# Patient Record
Sex: MALE | Race: WHITE
[De-identification: names, ages, dates, MRNs, and addresses within clinical notes are randomized per-mention and may not be internally consistent; named-entity substitution may affect disease eponyms.]

---

## 2019-11-21 ENCOUNTER — HOSPITAL ENCOUNTER (EMERGENCY)
Dept: HOSPITAL 97 - ER | Age: 54
Discharge: HOME | End: 2019-11-21
Payer: OTHER GOVERNMENT

## 2019-11-21 VITALS — TEMPERATURE: 98.4 F | OXYGEN SATURATION: 98 %

## 2019-11-21 VITALS — SYSTOLIC BLOOD PRESSURE: 121 MMHG | DIASTOLIC BLOOD PRESSURE: 80 MMHG
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
## 2019-11-21 DIAGNOSIS — Z72.0: ICD-10-CM

## 2019-11-21 DIAGNOSIS — M25.551: ICD-10-CM
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
## 2019-11-21 DIAGNOSIS — G89.29: Primary | ICD-10-CM

## 2019-11-21 PROCEDURE — 99284 EMERGENCY DEPT VISIT MOD MDM: CPT

## 2019-11-21 NOTE — RAD REPORT
EXAM DESCRIPTION:  RAD - Femur Right - 11/21/2019 8:32 pm

 

CLINICAL HISTORY:  Leg pain

 

FINDINGS:  No fracture is seen.

## 2019-11-21 NOTE — RAD REPORT
EXAM DESCRIPTION:  RAD - Hip Right 2 View - 11/21/2019 8:32 pm

 

CLINICAL HISTORY:  Right hip pain

 

FINDINGS:  No fracture or dislocation is seen.

## 2019-11-21 NOTE — EDPHYS
Physician Documentation                                                                           

 [facility]                                                                 

Name: Aimsh Espinosa                                                                                 

Age: 54 yrs                                                                                       

Sex: Male                                                                                         

: 1965                                                                                   

MRN: [id number]                                                                                   

Arrival Date: 2019                                                                          

Time: 19:51                                                                                       

Account#: [id number]                                                                            

Bed 15                                                                                            

Private MD:                                                                                       

ED Physician Sidney Bennett                                                                      

HPI:                                                                                              

                                                                                             

21:11 This 54 yrs old  Male presents to ER via EMS with complaints of Back Pain.     kb  

21:12 Details of fall: The patient fell from an upright position, while standing. Onset: The  kb  

      symptoms/episode began/occurred just prior to arrival. Associated injuries: The patient     

      sustained right hip and right knee and right forearm, painful injury. Severity of           

      symptoms: At their worst the symptoms were moderate, in the emergency department the        

      symptoms are unchanged. The patient has not experienced similar symptoms in the past.       

      The patient has not recently seen a physician. Pt reports chronic pain to right knee,       

      right hip, neck and back. Reports he fell in the shower just pta and now the chronic        

      pain is exacerbated. Denies any new pain.                                                   

                                                                                                  

Historical:                                                                                       

- Allergies:                                                                                      

19:55 No Known Allergies;                                                                     wh  

- Home Meds:                                                                                      

19:55 Morphine Oral [Active]; Flexeril Oral [Active];                                         wh  

- PMHx:                                                                                           

19:55 Compression Fractures;                                                                    

- PSHx:                                                                                           

19:55 Spinal Fusions;                                                                         wh  

                                                                                                  

- Immunization history:: Adult Immunizations not up to date.                                      

- Social history:: Smoking status: Patient uses tobacco products, denies chronic                  

  smoking, but will smoke occasionally.                                                           

- Ebola Screening: : Patient negative for fever greater than or equal to 101.5 degrees            

  Fahrenheit, and additional compatible Ebola Virus Disease symptoms Patient denies               

  exposure to infectious person.                                                                  

                                                                                                  

                                                                                                  

ROS:                                                                                              

21:10 Constitutional: Negative for fever, chills, and weight loss, ENT: Negative for injury,  kb  

      pain, and discharge, Neck: Negative for injury, pain, and swelling, Cardiovascular:         

      Negative for chest pain, palpitations, and edema, Respiratory: Negative for shortness       

      of breath, cough, wheezing, and pleuritic chest pain, Abdomen/GI: Negative for              

      abdominal pain, nausea, vomiting, diarrhea, and constipation, Back: Negative for injury     

      and pain, Skin: Negative for injury, rash, and discoloration, Neuro: Negative for           

      headache, weakness, numbness, tingling, and seizure.                                        

21:10 MS/extremity: Positive for pain, of the right hip, right forearm and right knee.            

                                                                                                  

Exam:                                                                                             

21:10 Constitutional:  This is a well developed, well nourished patient who is awake, alert,  kb  

      and in no acute distress. Head/Face:  Normocephalic, atraumatic. ENT:  Nares patent. No     

      nasal discharge, no septal abnormalities noted.  Tympanic membranes are normal and          

      external auditory canals are clear.  Oropharynx with no redness, swelling, or masses,       

      exudates, or evidence of obstruction, uvula midline.  Mucous membranes moist. Neck:         

      Trachea midline, no thyromegaly or masses palpated, and no cervical lymphadenopathy.        

      Supple, full range of motion without nuchal rigidity, or vertebral point tenderness.        

      No Meningismus. Chest/axilla:  Normal chest wall appearance and motion.  Nontender with     

      no deformity.  No lesions are appreciated. Cardiovascular:  Regular rate and rhythm         

      with a normal S1 and S2.  No gallops, murmurs, or rubs.  Normal PMI, no JVD.  No pulse      

      deficits. Respiratory:  Lungs have equal breath sounds bilaterally, clear to                

      auscultation and percussion.  No rales, rhonchi or wheezes noted.  No increased work of     

      breathing, no retractions or nasal flaring. Abdomen/GI:  Soft, non-tender, with normal      

      bowel sounds.  No distension or tympany.  No guarding or rebound.  No evidence of           

      tenderness throughout. Back:  No spinal tenderness.  No costovertebral tenderness.          

      Full range of motion. Skin:  Warm, dry with normal turgor.  Normal color with no            

      rashes, no lesions, and no evidence of cellulitis. MS/ Extremity:  Pulses equal, no         

      cyanosis.  Neurovascular intact.  Full, normal range of motion. Neuro:  Awake and           

      alert, GCS 15, oriented to person, place, time, and situation.  Cranial nerves II-XII       

      grossly intact.  Motor strength 5/5 in all extremities.  Sensory grossly intact.            

      Cerebellar exam normal.  Normal gait.                                                       

                                                                                                  

Vital Signs:                                                                                      

19:55  / 82; Pulse 69; Resp 18; Temp 98.4; Pulse Ox 98% ; Weight 104.33 kg; Height 6    wh  

      ft. 0 in. (182.88 cm);                                                                      

20:00  / 80; Pulse 68; Resp 18; Pulse Ox 98% on R/A;                                    ea  

19:55 Body Mass Index 31.19 (104.33 kg, 182.88 cm)                                              

                                                                                                  

MDM:                                                                                              

19:57 Patient medically screened.                                                             kb  

21:09 Data reviewed: vital signs, nurses notes. Data interpreted: Pulse oximetry: on room air kb  

      is 98 %. Interpretation: normal. Counseling: I had a detailed discussion with the           

      patient and/or guardian regarding: the historical points, exam findings, and any            

      diagnostic results supporting the discharge/admit diagnosis, radiology results, the         

      need for outpatient follow up, a family practitioner, to return to the emergency            

      department if symptoms worsen or persist or if there are any questions or concerns that     

      arise at home.                                                                              

                                                                                                  

                                                                                             

20:02 Order name: Hip Right 2 View XRAY; Complete Time: 21:14                                 kb  

                                                                                             

20:02 Order name: Femur Right XRAY; Complete Time: 21:                                      kb  

                                                                                             

20:02 Order name: Forearm Right XRAY; Complete Time: 21:14                                    kb  

                                                                                             

22:13 Order name: Crutches; Complete Time: 22:13                                              ea  

                                                                                                  

Administered Medications:                                                                         

20:09 Drug: Norco 10 mg-325 mg 1 tabs {Note: RASS 0.} Route: PO;                              ea  

21:14 Follow up: Response: No adverse reaction; Pain is decreased; RASS: Alert and Calm (0)   ea  

                                                                                                  

                                                                                                  

Disposition:                                                                                      

                                                                                             

12:09 Co-signature as Attending Physician, Sidney Bennett MD I agree with the assessment and  marta 

      plan of care.                                                                               

                                                                                                  

Disposition:                                                                                      

19 21:15 Discharged to Home. Impression: Fall on same level from slipping, tripping and     

  stumbling, Pain in right hip, Pain in right knee, Chronic pain, not elsewhere                   

  classified.                                                                                     

- Condition is Stable.                                                                            

- Discharge Instructions: Chronic Pain, Musculoskeletal Pain, Fall Prevention in the              

  Home, Easy-to-Read.                                                                             

- Prescriptions for Cyclobenzaprine 10 mg Oral Tablet - take 1 tablet by ORAL route               

  every 8 hours As needed; 21 tablet. Diclofenac Sodium 75 mg Oral Tablet, Delayed                

  Release (E.C.) - take 1 tablet by ORAL route 2 times per day As needed; 30 tablet.              

- Medication Reconciliation Form, Thank You Letter, Antibiotic Education, Prescription            

  Opioid Use form.                                                                                

- Follow up: Emergency Department; When: As needed; Reason: Worsening of condition.               

  Follow up: Private Physician; When: 2 - 3 days; Reason: Recheck today's complaints,             

  Continuance of care, Re-evaluation by your physician.                                           

                                                                                                  

                                                                                                  

                                                                                                  

Signatures:                                                                                       

Dispatcher [employer]                           EDMS                                                 

Jamison Husseinistin, FNP-C                 FNP-Ckb                                                   

Sidney Bennett MD MD cha Antunez, Elena, RN                      RN   Jelani Velazquez                                                   

                                                                                                  

Corrections: (The following items were deleted from the chart)                                    

                                                                                             

22:15 21:15 2019 21:15 Discharged to Home. Impression: Fall on same level from          ea  

      slipping, tripping and stumbling; Pain in right hip; Pain in right knee; Chronic pain,      

      not elsewhere classified. Condition is Stable. Forms are Medication Reconciliation          

      Form, Thank You Letter, Antibiotic Education, Prescription Opioid Use. Follow up:           

      Emergency Department; When: As needed; Reason: Worsening of condition. Follow up:           

      Private Physician; When: 2 - 3 days; Reason: Recheck today's complaints, Continuance of     

      care, Re-evaluation by your physician. kb                                                   

                                                                                                  

**************************************************************************************************

## 2019-11-21 NOTE — RAD REPORT
EXAM DESCRIPTION:  RAD - Forearm Right - 11/21/2019 8:32 pm

 

CLINICAL HISTORY:  Right arm pain

 

FINDINGS:  No fracture is seen.

## 2019-11-21 NOTE — ER
Nurse's Notes                                                                                     

 [facility]                                                                 

Name: Amish Espinosa                                                                                 

Age: 54 yrs                                                                                       

Sex: Male                                                                                         

: 1965                                                                                   

MRN: [id number]                                                                                   

Arrival Date: 2019                                                                          

Time: 19:51                                                                                       

Account#: [id number]                                                                            

Bed 15                                                                                            

Private MD:                                                                                       

Diagnosis: Fall on same level from slipping, tripping and stumbling;Pain in right hip;Pain in     

  right knee;Chronic pain, not elsewhere classified                                               

                                                                                                  

Presentation:                                                                                     

                                                                                             

19:52 Presenting complaint: EMS states: Pt fell from bath tub hitting his right arm. Pt       wh  

      states he has Hx of chronic back pain and knee pain and was exacerbated because of the      

      fall. Pt denies LOC. Transition of care: patient was not received from another setting      

      of care. Onset of symptoms was 2019. Risk Assessment: Do you want to hurt      

      yourself or someone else? Patient reports no desire to harm self or others. Initial         

      Sepsis Screen: Does the patient meet any 2 criteria? No. Patient's initial sepsis           

      screen is negative. Does the patient have a suspected source of infection? No.              

      Patient's initial sepsis screen is negative. Care prior to arrival: None.                   

19:52 Method Of Arrival: EMS: [facility]  

19:52 Acuity: ANTONINO 4                                                                             

                                                                                                  

Historical:                                                                                       

- Allergies:                                                                                      

19:55 No Known Allergies;                                                                       

- Home Meds:                                                                                      

19:55 Morphine Oral [Active]; Flexeril Oral [Active];                                           

- PMHx:                                                                                           

19:55 Compression Fractures;                                                                    

- PSHx:                                                                                           

19:55 Spinal Fusions;                                                                           

                                                                                                  

- Immunization history:: Adult Immunizations not up to date.                                      

- Social history:: Smoking status: Patient uses tobacco products, denies chronic                  

  smoking, but will smoke occasionally.                                                           

- Ebola Screening: : Patient negative for fever greater than or equal to 101.5 degrees            

  Fahrenheit, and additional compatible Ebola Virus Disease symptoms Patient denies               

  exposure to infectious person.                                                                  

                                                                                                  

                                                                                                  

Screenin:55 Abuse screen: Denies threats or abuse. Denies injuries from another. Nutritional          

      screening: No deficits noted. Tuberculosis screening: No symptoms or risk factors           

      identified. Fall Risk Fall in past 12 months (25 points).                                   

                                                                                                  

Assessment:                                                                                       

21:03 Reassessment: Patient and/or family updated on plan of care and expected duration. Pain ea  

      level reassessed. Pt resting with eyes closed, respirations even and unlabored, chest       

      expansions even and symmetrical. No s/s of pain or discomfort noted at this time.           

22:07 Reassessment: Patient and/or family updated on plan of care and expected duration. Pain ea  

      level reassessed. Pt offered walker as ambulatory aid, pt refused walker stated " I         

      would do better with crutches, I can't lift the walker even a little".                      

22:13 Reassessment: Patient and/or family updated on plan of care and expected duration. Pain ea  

      level reassessed. Patient is alert, oriented x 3, equal unlabored respirations, skin        

      warm/dry/pink. Discharge given to patient, verbalized the understanding of instruction.     

      Pt awaiting in the lobby for taxi.                                                          

                                                                                                  

Vital Signs:                                                                                      

19:55  / 82; Pulse 69; Resp 18; Temp 98.4; Pulse Ox 98% ; Weight 104.33 kg; Height 6    wh  

      ft. 0 in. (182.88 cm);                                                                      

20:00  / 80; Pulse 68; Resp 18; Pulse Ox 98% on R/A;                                    ea  

19:55 Body Mass Index 31.19 (104.33 kg, 182.88 cm)                                              

                                                                                                  

ED Course:                                                                                        

19:51 Patient arrived in ED.                                                                  fc  

19:53 Triage completed.                                                                       wh  

19:56 Arm band placed on.                                                                       

19:56 Patient has correct armband on for positive identification. Bed in low position. Call     

      light in reach. Side rails up X 1. Pulse ox on. NIBP on.                                    

19:57 Nancy Hussein FNP-C is PHCP.                                                        kb  

19:57 Sidney Bennett MD is Attending Physician.                                             kb  

19:58 Caryl Tan RN is Primary Nurse.                                                    ea  

20:31 Hip Right 2 View XRAY In Process Unspecified.                                           EDMS

20:31 Femur Right XRAY In Process Unspecified.                                                EDMS

20:31 Forearm Right XRAY In Process Unspecified.                                              EDMS

22:14 No provider procedures requiring assistance completed. Patient did not have IV access   ea  

      during this emergency room visit.                                                           

                                                                                                  

Administered Medications:                                                                         

20:09 Drug: Norco 10 mg-325 mg 1 tabs {Note: RASS 0.} Route: PO;                              ea  

21:14 Follow up: Response: No adverse reaction; Pain is decreased; RASS: Alert and Calm (0)   ea  

                                                                                                  

                                                                                                  

Outcome:                                                                                          

21:15 Discharge ordered by MD.                                                                kb  

22:13 Discharged to home with crutches.                                                       ea  

22:13 Condition: stable                                                                           

22:13 Discharge instructions given to patient, Instructed on discharge instructions, follow       

      up and referral plans. medication usage, Demonstrated understanding of instructions,        

      follow-up care, medications, Prescriptions given X 2.                                       

22:15 Patient left the ED.                                                                    ea  

                                                                                                  

Signatures:                                                                                       

Dispatcher [employer]                           EDMS                                                 

Nancy Hussein, Augusta Waldrop RN RN fc Antunez, Elena, RN RN ea Habalo, Winsy                                                                                   

                                                                                                  

Corrections: (The following items were deleted from the chart)                                    

21:14 20:09 Norco 10 mg-325 mg 1 tabs PO ea                                                   ea  

22:14 22:13 Reassessment: Patient and/or family updated on plan of care and expected          ea  

      duration. Pain level reassessed. Patient is alert, oriented x 3, equal unlabored            

      respirations, skin warm/dry/pink. Discharge given to patient, verbalized the                

      understanding of instruction ea                                                             

                                                                                                  

**************************************************************************************************

## 2020-03-02 ENCOUNTER — HOSPITAL ENCOUNTER (EMERGENCY)
Dept: HOSPITAL 97 - ER | Age: 55
Discharge: LEFT BEFORE DISCHARGE | End: 2020-03-02
Payer: OTHER GOVERNMENT

## 2020-03-02 VITALS — SYSTOLIC BLOOD PRESSURE: 112 MMHG | DIASTOLIC BLOOD PRESSURE: 83 MMHG | OXYGEN SATURATION: 96 % | TEMPERATURE: 98.6 F

## 2020-03-02 DIAGNOSIS — G89.29: Primary | ICD-10-CM

## 2020-03-02 DIAGNOSIS — Z53.21: ICD-10-CM

## 2020-03-02 PROCEDURE — 99283 EMERGENCY DEPT VISIT LOW MDM: CPT

## 2020-03-02 NOTE — ER
Nurse's Notes                                                                                     

 [facility]                                                                 

Name: Amish Espinosa                                                                                 

Age: 54 yrs                                                                                       

Sex: Male                                                                                         

: 1965                                                                                   

MRN: [id number]                                                                                   

Arrival Date: 2020                                                                          

Time: 13:11                                                                                       

Account#: [id number]                                                                            

Bed 20                                                                                            

Private MD:                                                                                       

Diagnosis: Other chronic pain                                                                     

                                                                                                  

Presentation:                                                                                     

                                                                                             

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 

      [facility] when he is went down on his knees because of knee, back and neck         

      pain and shortness of breath. he has chronic pain. he has history of multiple falls in      

      the past. [id number]. Coronavirus screen: The patient has NOT traveled to [place] in the         

      past 14 days. Proceed with normal triage procedures. The patient has NOT had contact        

      with known and/or suspected case of Coronavirus. Proceed with normal triage procedures.     

      Ebola Screen: No symptoms or risks identified at this time. Initial Sepsis Screen: Does     

      the patient meet any 2 criteria? No. Patient's initial sepsis screen is negative. Does      

      the patient have a suspected source of infection? No. Patient's initial sepsis screen       

      is negative. Risk Assessment: Do you want to hurt yourself or someone else? Patient         

      reports no desire to harm self or others.                                                   

13:11 Method Of Arrival: EMS: [facility]                                                mg2 

13:11 Acuity: ANTONINO 4                                                                           mg2 

                                                                                                  

Historical:                                                                                       

- Allergies:                                                                                      

13:18 No Known Allergies;                                                                     mg2 

- Home Meds:                                                                                      

13:18 Flexeril Oral [Active]; Morphine Oral [Active];                                         mg2 

- PMHx:                                                                                           

13:18 Compression Fractures;                                                                  mg2 

- PSHx:                                                                                           

13:18 knee, back sx;                                                                          mg2 

                                                                                                  

- Immunization history:: Flu vaccine is up to date.                                               

- Social history:: Smoking status: Patient denies any tobacco usage or history of.                

  Patient/guardian denies using alcohol, street drugs, IV drugs.                                  

                                                                                                  

                                                                                                  

Screenin:40 Abuse screen: Denies threats or abuse. Denies injuries from another. Nutritional        mg2 

      screening: No deficits noted. Tuberculosis screening: No symptoms or risk factors           

      identified.                                                                                 

13:40 Fall Risk Ambulatory Aid- Crutches/Cane/Walker (15 pts).                                mg2 

                                                                                                  

Assessment:                                                                                       

13:40 Reassessment: patient refused to stay. wants to leave after started talking to the      mg2 

      provider. offered wheelchair but he refused.  andriy was called too.                     

                                                                                                  

Vital Signs:                                                                                      

13:11  / 83; Pulse 90; Resp 18; Temp 98.6; Pulse Ox 96% on R/A; Weight 99.79 kg; Height mg2 

      6 ft. 0 in. (182.88 cm); Pain 8/10;                                                         

13:11 Body Mass Index 29.84 (99.79 kg, 182.88 cm)                                             mg2 

                                                                                                  

ED Course:                                                                                        

13:11 Patient arrived in ED.                                                                  mg2 

13:15 Triage completed.                                                                       mg2 

13:16 Per Romero MD is Attending Physician.                                            tw4 

13:18 Arm band placed on.                                                                     mg2 

13:40 Reinaldo Puente, RN is Primary Nurse.                                                  mg2 

13:40 No provider procedures requiring assistance completed.                                  mg2 

13:40 Patient did not have IV access during this emergency room visit.                        mg2 

                                                                                                  

Administered Medications:                                                                         

No medications were administered                                                                  

                                                                                                  

                                                                                                  

Outcome:                                                                                          

13:43 Patient left the ED.                                                                    mg2 

13:43 Eloped from patient exam room, after seeing physician Time discovered patient gone:     mg2 

      2020 at 13:43                                                                     

13:43 Condition: stable                                                                           

13:43 Instructed on Demonstrated understanding of instructions.                                   

                                                                                                  

Signatures:                                                                                       

Per Romero MD MD   tw4                                                  

Reinaldo Puente, RN                    RN   mg2                                                  

                                                                                                  

Corrections: (The following items were deleted from the chart)                                    

13:16 13:11 Chief complaint: EMS states: he was at the congressman's office today trying to   mg2 

      [facility] when he is went down on his knees because of knee, back and      

      neck pain and shortness of breath. he has chronic pain. he has history of multiple          

      falls in the past. mg2                                                                      

13:16 13:11 Acuity: ANTONINO 3 mg2                                                                 mg2 

                                                                                                  

**************************************************************************************************

## 2020-03-03 NOTE — EDPHYS
Physician Documentation                                                                           

 [facility]                                                                 

Name: Amish Espinosa                                                                                 

Age: 54 yrs                                                                                       

Sex: Male                                                                                         

: 1965                                                                                   

MRN: [id number]                                                                                   

Arrival Date: 2020                                                                          

Time: 13:11                                                                                       

Account#: [id number]                                                                            

Bed 20                                                                                            

Private MD:                                                                                       

ED Physician Per Romero                                                                     

HPI:                                                                                              

                                                                                             

21:06 This 54 yrs old  Male presents to ER via EMS with complaints of back pain.     tw4 

21:06 The patient presents with pain that is chronic, with no known mechanism of injury.      tw4 

      Onset: The symptoms/episode began/occurred today. Unable to obtain HPI due to patient       

      is being uncooperative.                                                                     

21:06 Pt refused to answer questions and stated that " if we weren't going to help him, he    tw4 

      was leaving". Pt refused exam disconnected leads and stated that he would crawl out of      

      the hospital and seek help elsewhere. Pt refused to stand and crawled on the floor to       

      the lobby.                                                                                  

                                                                                                  

Historical:                                                                                       

- Allergies:                                                                                      

13:18 No Known Allergies;                                                                     mg2 

- Home Meds:                                                                                      

13:18 Flexeril Oral [Active]; Morphine Oral [Active];                                         mg2 

- PMHx:                                                                                           

13:18 Compression Fractures;                                                                  mg2 

- PSHx:                                                                                           

13:18 knee, back sx;                                                                          mg2 

                                                                                                  

- Immunization history:: Flu vaccine is up to date.                                               

- Social history:: Smoking status: Patient denies any tobacco usage or history of.                

  Patient/guardian denies using alcohol, street drugs, IV drugs.                                  

                                                                                                  

                                                                                                  

Vital Signs:                                                                                      

13:11  / 83; Pulse 90; Resp 18; Temp 98.6; Pulse Ox 96% on R/A; Weight 99.79 kg; Height mg2 

      6 ft. 0 in. (182.88 cm); Pain 8/10;                                                         

13:11 Body Mass Index 29.84 (99.79 kg, 182.88 cm)                                             mg2 

                                                                                                  

MDM:                                                                                              

13:16 Patient medically screened.                                                             tw4 

                                                                                                  

Administered Medications:                                                                         

No medications were administered                                                                  

                                                                                                  

                                                                                                  

Disposition:                                                                                      

20 13:42 Patient left the facility after being seen by provider. Preliminary diagnosis      

  is Other chronic pain.                                                                          

- Patient left due to (see nurse's notes).                                                        

                                                                                                  

- Problem is an ongoing problem.                                                                  

- Symptoms are unchanged.                                                                         

                                                                                                  

                                                                                                  

                                                                                                  

Signatures:                                                                                       

Per Romero MD MD   tw4                                                  

Reinaldo Puente RN                    RN   mg2                                                  

                                                                                                  

Corrections: (The following items were deleted from the chart)                                    

13:43 13:42 2020 13:42 Patient left the facility after being seen by provider.          mg2 

      Preliminary diagnosis is Other chronic pain. Reason stated they are leaving due to (see     

      nurse's notes). Problem is an ongoing problem. Symptoms are unchanged. tw4                  

                                                                                                  

**************************************************************************************************

## 2021-03-03 ENCOUNTER — HOSPITAL ENCOUNTER (EMERGENCY)
Dept: HOSPITAL 97 - ER | Age: 56
LOS: 1 days | Discharge: HOME | End: 2021-03-04
Payer: COMMERCIAL

## 2021-03-03 DIAGNOSIS — Z20.822: ICD-10-CM

## 2021-03-03 DIAGNOSIS — F33.9: ICD-10-CM

## 2021-03-03 DIAGNOSIS — G89.29: ICD-10-CM

## 2021-03-03 DIAGNOSIS — Z91.5: ICD-10-CM

## 2021-03-03 DIAGNOSIS — F17.210: ICD-10-CM

## 2021-03-03 DIAGNOSIS — M54.5: ICD-10-CM

## 2021-03-03 DIAGNOSIS — R45.851: Primary | ICD-10-CM

## 2021-03-03 LAB
ALBUMIN SERPL BCP-MCNC: 4.4 G/DL (ref 3.4–5)
ALP SERPL-CCNC: 58 U/L (ref 45–117)
ALT SERPL W P-5'-P-CCNC: 61 U/L (ref 12–78)
AST SERPL W P-5'-P-CCNC: 51 U/L (ref 15–37)
BUN BLD-MCNC: 6 MG/DL (ref 7–18)
GLUCOSE SERPLBLD-MCNC: 97 MG/DL (ref 74–106)
HCT VFR BLD CALC: 47.5 % (ref 39.6–49)
INR BLD: 1.01
LYMPHOCYTES # SPEC AUTO: 2.3 K/UL (ref 0.7–4.9)
METHAMPHET UR QL SCN: NEGATIVE
PMV BLD: 8.4 FL (ref 7.6–11.3)
POTASSIUM SERPL-SCNC: 4.2 MMOL/L (ref 3.5–5.1)
RBC # BLD: 5.34 M/UL (ref 4.33–5.43)
THC SERPL-MCNC: POSITIVE NG/ML

## 2021-03-03 PROCEDURE — 81003 URINALYSIS AUTO W/O SCOPE: CPT

## 2021-03-03 PROCEDURE — 80076 HEPATIC FUNCTION PANEL: CPT

## 2021-03-03 PROCEDURE — 80320 DRUG SCREEN QUANTALCOHOLS: CPT

## 2021-03-03 PROCEDURE — 85025 COMPLETE CBC W/AUTO DIFF WBC: CPT

## 2021-03-03 PROCEDURE — 80329 ANALGESICS NON-OPIOID 1 OR 2: CPT

## 2021-03-03 PROCEDURE — 36415 COLL VENOUS BLD VENIPUNCTURE: CPT

## 2021-03-03 PROCEDURE — 85610 PROTHROMBIN TIME: CPT

## 2021-03-03 PROCEDURE — 72131 CT LUMBAR SPINE W/O DYE: CPT

## 2021-03-03 PROCEDURE — 99285 EMERGENCY DEPT VISIT HI MDM: CPT

## 2021-03-03 PROCEDURE — 80048 BASIC METABOLIC PNL TOTAL CA: CPT

## 2021-03-03 PROCEDURE — 85730 THROMBOPLASTIN TIME PARTIAL: CPT

## 2021-03-03 PROCEDURE — 80307 DRUG TEST PRSMV CHEM ANLYZR: CPT

## 2021-03-03 PROCEDURE — 93005 ELECTROCARDIOGRAM TRACING: CPT

## 2021-03-03 NOTE — XMS REPORT
Continuity of Care Document

                            Created on:March 3, 2021



Patient:JOAN GREGORY

Sex:Male

:1965

External Reference #:[id number]





Demographics







                          Address                   [address]



                                                    [place] [address]

 

                          Home Phone                [phone]

 

                          Mobile Phone              [phone]

 

                          Email Address             [email or web address]

 

                          Preferred Language        English

 

                          Marital Status            Unknown

 

                          Confucianism Affiliation     Unknown

 

                          Race                      Unknown

 

                          Additional Race(s)        Unavailable



                                                    White

 

                          Ethnic Group              Not  or 









Author







                          Organization              [facility]

t

 

                          Address                   [address]



                                                    [place] [address]

 

                          Phone                     [phone]









Support







                Name            Relationship    Address         Phone

 

                Fabián           Mother          [address] [phone]



                                                [place] [address] 

 

                No              Other           Unavailable     [phone]









Care Team Providers







                    Name                Role                Phone

 

                    Asked,  Pcp         Primary Care Physician Unavailable

 

                    Demond Jesus MD    Attending Clinician [phone]









Payers







           Payer Name Policy Type Policy     Effective Date Expiration Date Sour

ce



                                 Number                           

 

           DEPT OF             [id number]  2020            [facility]



           AFFAIRSDEPT OF                       00:00:00              [facility]



                                                           



           [id number]                                             



           /2020-PresentMi                                             



           litary                                                 







Problems







       Condition Condition Condition Status Onset  Resolution Last   Treating Co

mments 

Source



       Name   Details Category        Date   Date   Treatment Clinician        



                                                 Date                 

 

       Rib pain Rib pain Disease Active                              [facility]

s



       on right on right               5-13                               Health



       side   side                 00:00:                             



                                   00                                 

 

       Mixed  Mixed  Disease Active                              [facility]



       hyperlipid hyperlipid               5-                               He

alth



       emia   emia                 00:00:                             



                                   00                                 

 

       Elevated Elevated Disease Active                              [facility]

s



       blood  blood                5-13                               Health



       pressure pressure               00:00:                             



       reading reading               00                                 



       without without                                                  



       diagnosis diagnosis                                                  



       of     of                                                      



       hypertensi hypertensi                                                  



       on     on                                                      

 

       Bulging Bulging Disease Active 2015                             [facility]



       discs  discs                0-22                               Health



                                   00:00:                             



                                   00                                 

 

       Back pain Back pain Disease Active 2015                             [facility]

[facility]



                                   0-22                               Health



                                   00:00:                             



                                   00                                 







Allergies, Adverse Reactions, Alerts

This patient has no known allergies or adverse reactions.



Social History







           Social Habit Start Date Stop Date  Quantity   Comments   Source

 

           History of tobacco                       Cigarette Smoker            

[facility]



           use                                                    

 

           Alcohol Comment                       occassionally            [facility]

 

           Sex Assigned At                                             [facility]

alth



           Birth                                                  

 

           Cigarettes smoked 2016-09-15 2016-09-15                       [facility]



           current (pack per 00:00:00   00:00:00                         



           day) - Reported                                             

 

           Cigarette  2016-09-15 2016-09-15                       [facility]



           pack-years 00:00:00   00:00:00                         

 

           Tobacco use and 2016-09-15 2016-09-15 Never used            [facility]

alth



           exposure   00:00:00   00:00:00                         

 

           Alcohol intake 2016-09-15 2016-09-15 Current drinker of            [employer]



                      00:00:00   00:00:00   alcohol (finding)            









                Smoking Status  Start Date      Stop Date       Source

 

                Current every day smoker 2016-09-15 00:00:00                 [facility]







Medications







       Ordered Filled Start  Stop   Current Ordering Indication Dosage Frequency

 Signature

                    Comments            Components          Source



     Medication Medication Date Date Medication? Clinician                (SIG) 

          



     Name Name                                                   

 

     ondansetron            Yes            4mg  Q8H  Take 1           [facility]

[facility]



     (ZOFRAN) 4      3-03                               tablet (4           Meth

estefany



     MG tablet      00:00:                               mg total)           st



               00                                 by mouth           



                                                  every 8           



                                                  (eight)           



                                                  hours as           



                                                  needed for           



                                                  nausea or           



                                                  vomiting           



                                                  for up to           



                                                  8 doses.           

 

     methocarbam      -2020- No             500mg Q.5D Take 1           [facility]

[place]



     oL        3- 04-02                          tablet           [facility]



     (ROBAXIN)      00:00: 23:59                          (500 mg           st



     500 MG      00   :00                           total) by           



     tablet                                         mouth 2           



                                                  (two)           



                                                  times a           



                                                  day for 30           



                                                  days.           

 

     meloxicam      - 2020- No             7.5mg QD   Take 1           [facility]

[facility]



     (MOBIC) 7.5      3-03 04-02                          tablet           [facility]

di



     mg tablet      00:00: 23:59                          (7.5 mg           st



               00   :00                           total) by           



                                                  mouth           



                                                  daily for           



                                                  30 days.           

 

     mometasone            Yes       Other 2{spray QD   2 Sprays          

 [facility]



     (NASONEX)      9-15                allergic }         by each           a

lth



     50        00:00:                rhinitis           nostril           



     mcg/actuati      00                                 route           



     on nasal                                         daily.           



     spray                                                        

 

     cetirizine            Yes       Other 10mg QD   Take 1           Bj

is



     (ZYRTEC) 10      9-15                allergic           tablet by          

 Health



     mg tablet      00:00:                rhinitis           mouth           



               00                                 daily.           

 

     HYDROcodone            Yes       Bulging 1{tbl}      Take 1          

 [facility]



     -acetaminop      9-15                discs           tablet by           He

alth



     hen (NORCO)      00:00:                               mouth           



     5-325 mg      00                                 every 8           



     tablet                                         hours as           



                                                  needed for           



                                                  Pain.           

 

     ketoconazol            Yes       Tinea      Q.5D Apply to           H

arris



     e (NIZORAL)                      cruris           affected           He

alth



     2 % topical      00:00:                               area 2           



     cream      00                                 times           



                                                  daily.           

 

     methocarbam            Yes       DDD  750mg      Take 1           [facility]

[facility]



     ol        7                (degenerati           tablet by           [facility]



     (ROBAXIN-75      00:00:                ve disc           mouth 3           



     0) 750 mg      00                  disease),           times           



     tablet                          thoracic           daily.           

 

     mometasone            Yes       Cough 2{spray QD   2 Sprays          

 [facility]



     (NASONEX)                           }         by [facility]



     50        00:00:                               nostril           



     mcg/actuati      00                                 route           



     on nasal                                         daily.           



     spray                                                        

 

     cetirizine            Yes       Cough 10mg QD   Take 1           [facility]

is



     (ZYRTEC) 10      7                               tablet by           [facility]



     mg tablet      00:00:                               mouth           



               00                                 daily.           

 

     ibuprofen            Yes       Streptococc 600mg      Take 1         

  [facility]



     (MOTRIN)      1-11                al sore           tablet by           [facility]



     600 mg      00:00:                throat           mouth           



     tablet      00                                 every 8           



                                                  hours as           



                                                  needed for           



                                                  Pain.           

 

     simvastatin            Yes       Other and 40mg      Take 1          

 [facility]



     (ZOCOR) 40      9-10                unspecified           tablet by        

   [facility]



     mg tablet      00:00:                hyperlipide           mouth at        

   



               00                  marlo            bedtime           



                                                  nightly.           

 

     gabapentin            Yes       DDD  300mg      Take 1           [facility]

is



     (NEURONTIN)                      (degenerati           capsule by      

     [facility]



     300 mg      00:00:                ve disc           mouth 3           



     capsule      00                  disease),           times           



                                   thoracic           daily.           







Vital Signs







             Vital Name   Observation Time Observation Value Comments     Source

 

             Systolic blood 2020 22:37:03 173 mm[Hg]                [facility]

n [facility]



             pressure                                            

 

             Diastolic blood 2020 22:37:03 85 mm[Hg]                 [facility]

[facility]



             pressure                                            

 

             Heart rate   2020 22:37:03 98 /min                   T[facility]

 

             Body temperature 2020 22:37:03 37 Triica                    [facility]

[facility]

 

             Respiratory rate 2020 22:37:03 16 /min                   Shannan Watson

 

             Oxygen saturation in 2020 22:37:03 98 /min                   

Todd Watson



             Arterial blood by                                        



             Pulse oximetry                                        

 

             Body height  2020 18:32:00 182.9 cm                  Todd Watson

 

             Body weight  2020 18:32:00 81.647 kg                 Todd Watson

 

             BMI          2020 18:32:00 24.41 kg/m2               Todd Watson







Procedures







                Procedure       Date / Time Performed Performing Clinician Sour

e

 

                URINE CULTURE   2020 21:37:00 Yina Jesus Me

[facility]

 

                URINALYSIS SCREEN AND 2020 21:37:00 Yina Jesus



                MICROSCOPY, WITH REFLEX                                 



                TO CULTURE                                      

 

                URINE DRUGS OF ABUSE 2020 21:37:00 Yina Jesus

on [facility]



                SCREEN                                          

 

                ECG 12-LEAD     2020 20:39:33 Yina Jesus Me

[facility]

 

                INFLUENZA ANTIGEN TEST, 2020 20:01:00 Yina Jesus



                REFLEX NEGATIVE TO RPP                                 

 

                RESPIRATORY PATHOGEN 2020 20:01:00 Yina Jesus [facility]



                PANEL WITH COVID-19                                 

 

                HC COMPLETE BLD COUNT 2020 19:57:00 Yina Jesus



                W/AUTO DIFF                                     

 

                COMPREHENSIVE METABOLIC 2020 19:57:00 Yina Jesus



                PANEL                                           

 

                LACTIC ACID LEVEL, SEPSIS 2020 19:57:00 Yina Jesus



                - NOW AND REPEAT 2X EVERY                                 



                3 HOURS                                         

 

                CREATINE KINASE, TOTAL 2020 19:57:00 Yina Jesus



                (CPK)                                           

 

                ALCOHOL LEVEL, BLOOD 2020 19:57:00 Yina Jesusist

 

                ESTIMATED GFR   2020 19:57:00 Yina Jesus Me

thodist

 

                BLOOD CULTURE, AEROBIC & 2020 19:01:00 Yina Jesus



                ANAEROBIC                                       

 

                BLOOD CULTURE, AEROBIC & 2020 18:57:00 Yina Jesus



                ANAEROBIC                                       







Plan of Care







             Planned Activity Planned Date Details      Comments     Source

 

             Future Scheduled Test 2020-10-01 00:00:00 IMM Influenza            

  [facility]



                                       Seasonal Oct to March              



                                       (>/= 19 yrs) [code =              



                                       IMM Influenza              



                                       Seasonal Oct to March              



                                       (>/= 19 yrs)]              

 

             Future Scheduled Test 2017 00:00:00 Screening for            

  [facility]



                                       malignant neoplasm of              



                                       colon (procedure)              



                                       [code = 524871977]              







Encounters







        Start   End     Encounter Admission Attending Care    Care    Encounter 

Source



        Date/Time Date/Time Type    Type    Clinicians Facility Department ID   

   

 

        2020 Emergency         MANUS, YINA [facility]     064     [address]

[id number] [facility]



        00:00:00 00:00:00                                         148     Method

i



                                                                        st







Results







           Test Description Test Time  Test Comments Results    Result Comments 

Source









                    Blood culture, aerobic & anaerobic 2020 03:33:05 









                      Test Item  Value      Reference Range Interpretation Comme

nts









             Blood culture isolate No growth after 5 days of                    

       Specimen InformationSpecimen



             (test code = 600-7) incubation.                            Source: 

BloodSpecimen Site: Arm,



                                                                 left



[facility] [id number] 20:55:24





             Test Item    Value        Reference Range Interpretation Comments

 

             Ventricular rate (test 95                                     



             code = 253)                                         

 

             Atrial rate (test code 95                                     



             = 255)                                              

 

             AL interval (test code 142                                    



             = 266)                                              

 

             QRSD interval (test 84                                     



             code = 260)                                         

 

             QT interval (test code 378                                    



             = 264)                                              

 

             QTC interval (test code 475                                    



             = 265)                                              

 

             P axis 1 (test code = 48                                     



             267)                                                

 

             QRS axis 1 (test code = 69                                     



             268)                                                

 

             T wave axis (test code 52                                     



             = 270)                                              

 

             EKG impression (test Normal sinus                           



             code = 273)  rhythm-Normal ECG-In                           



                          automated comparison                           



                          with ECG of                            



                          03-MAR-2020 20:38,-No                           



                          significant change was                           



                          found-Electronically                           



                          Signed By Hiral FERRO,                           



                          Max VELÁZQUEZ ([id number]) on                           



                          3/5/2020 8:55:23 PM                           



[facility] Methodist[facility]piratory pathogen [id number] 09:11:42Respiratory 
pathogen panelNegative for all pathogens tested:Negative for AdenovirusNegative 
for Coronavirus KSZ5Lofalzbr for Coronavirus OT95Sbellycm for Coronavirus 
229ENegative for Coronavirus BL35Qhxfmzxo for Human MetapneumovirusNegative for 
Rhinovirus/EnterovirusNegative for Influenza ANegative for Influenza 
A/C6Nrxsuxlc for Influenza A/W8Jwglhuwj for Influenza A/H1-2009Negative for 
Influenza BNegative for Parainfluenza Virus 1Negative for Parainfluenza Virus 
2Negative for Parainfluenza Virus 3Negative for Parainfluenza Virus 4Negative 
for Respiratory Syncytial VirusNegative for Bordetella pertussisNegative for 
Chlamydophila pneumoniaeNegative for Mycoplasma pneumoniaeThis real-time PCR 
assaydetects the presence of nucleic acids (RNA or DNA) for the respiratory 
pathogens listed.  A result of "Not-detected" does not exclude the possibility 
of the presence of one or more pathogens at concentrations less than the 
detectable limits of the assay. Comment: Specimen InformationSpecimen Source: Na
sopharyngealSpecimen Site: [facility]
drugs of abuse [id number] 22:25:30





             Test Item    Value        Reference    Interpretation Comments



                                       Range                     

 

             Amphetamine screen, Negative                               



             urine (test code =                                        



             3349-8)                                             

 

             Barbiturate screen, Negative                               



             urine (test code =                                        



             3377-9)                                             

 

             Benzodiazepine Negative                               



             screen, urine (test                                        



             code = 3390-2)                                        

 

             Cocaine screen, Negative                               



             urine (test code =                                        



             3397-7)                                             

 

             Methadone    Negative                               



             metabolite (EDDP),                                        



             urine (test code =                                        



             78137-0)                                            

 

             Opiates screen, Negative                               



             urine (test code =                                        



             3879-4)                                             

 

             Oxycodone screen, Negative                               



             urine (test code =                                        



             70366-2)                                            

 

             Phencyclidine Negative                               



             screen, urine (test                                        



             code = 3936-2)                                        

 

             Tricyclic screen, Negative                               



             urine (test code =                                        



             45040-9)                                            

 

             Cannabinoid screen, Positive                  A            Drug scr

een minimum



             urine (test code =                                        concentra

tion of



             3427-2)                                             detectabilityAm

phetamines



                                                                            1000



                                                                 ng/mLBarbiturat

es



                                                                     200 ng/mLBe

nzodiazepines



                                                                             300

 ng/mLCocaine



                                                                                

     300



                                                                 ng/mLMethadone



                                                                     300 ng/mLOp

iates



                                                                             300



                                                                 ng/mLOxycodone



                                                                     300 ng/mLPh

encyclidine



                                                                              25



                                                                 ng/mLCannabinoi

ds



                                                                      50 ng/mLTr

icyclics



                                                                            1000

 ng/mLResults



                                                                 are from screen

ing tests and



                                                                 should only be 

used for



                                                                 medical evaluat

ion. Drug



                                                                 testing for leg

al purposes



                                                                 requires defini

tive (or



                                                                 confirmatory) t

esting



                                                                 methods, which 

are available



                                                                 upon request. C

ontact the



                                                                 laboratory if d

efinitive



                                                                 testing is requ

ired.

 

             Lab Interpretation Abnormal                               



             (test code =                                        



             96509-0)                                            



[id number] 22:10:23





             Test Item    Value        Reference Range Interpretation Comments

 

             Urine culture (test SEE COMMENT                            Bacteriu

zenobia screen



             code = 4066162)                                        negative.



[place], with reflex to culture
2020 22:10:20





             Test Item    Value        Reference Range Interpretation Comments

 

             Specimen site (test Clean catch                            



             code = 3327322)                                        

 

             Color, UA (test code = Yellow       YELLOW                    



             5778-6)                                             

 

             Appearance, UA (test Clear        Clear                     



             code = 5767-9)                                        

 

             Specific gravity, UA 1.005        1.005-1.030               



             (test code = 5811-5)                                        

 

             pH, UA (test code = 9.0          5.0-8.0      A            



             5803-2)                                             

 

             Protein, UA (test code Negative     Negative                  



             = 18579-9)                                          

 

             Glucose, UA (test code Negative     Negative                  



             = 70401-4)                                          

 

             Ketones, UA (test code Negative     Negative                  



             = 2514-8)                                           

 

             Bilirubin, UA (test Negative     Negative                  



             code = 5770-3)                                        

 

             Blood, UA (test code = Negative     Negative                  



             5794-3)                                             

 

             Nitrite, UA (test code Negative     NEGATIVE                  



             = 5802-4)                                           

 

             Urobilinogen, UA (test <2.0         See_Comment                [Aut

omated



             code = 44653-8)                                        message] The

 system



                                                                 which generated



                                                                 this result



                                                                 transmitted



                                                                 reference range

:



                                                                 <2.0 E.U./dL. T

he



                                                                 reference range

 was



                                                                 not used to



                                                                 interpret this



                                                                 result as



                                                                 normal/abnormal

.

 

             Leukocyte esterase, UA Negative     Negative                  



             (test code = 5799-2)                                        

 

             Epithelial cells, UA <1           See_Comment                [Autom

ated



             (test code = 5787-7)                                        message

] The system



                                                                 which generated



                                                                 this result



                                                                 transmitted



                                                                 reference range

: 0



                                                                 - 15 /HPF. The



                                                                 reference range

 was



                                                                 not used to



                                                                 interpret this



                                                                 result as



                                                                 normal/abnormal

.

 

             WBC, UA (test code = 1            See_Comment                [Autom

ated



             5821-4)                                             message] The sy

stem



                                                                 which generated



                                                                 this result



                                                                 transmitted



                                                                 reference range

: 0



                                                                 - 5 /Hpf. The



                                                                 reference range

 was



                                                                 not used to



                                                                 interpret this



                                                                 result as



                                                                 normal/abnormal

.

 

             RBC, UA (test code = None seen    See_Comment                [Autom

ated



             35739-1)                                            message] The sy

stem



                                                                 which generated



                                                                 this result



                                                                 transmitted



                                                                 reference range

: 0



                                                                 - 5 /HPF. The



                                                                 reference range

 was



                                                                 not used to



                                                                 interpret this



                                                                 result as



                                                                 normal/abnormal

.

 

             Bacteria, UA (test code None seen    None seen                 



             = 21767-7)                                          

 

             Yeast, UA (test code = None seen    None Seen                 



             93654-9)                                            

 

             Yeast with   None seen                              



             pseudohyphae, UA (test                                        



             code = 33428-2)                                        

 

             Lab Interpretation Abnormal                               



             (test code = 10536-9)                                        



[facility], reflex negative to [id number] 
20:59:25





             Test Item    Value        Reference    Interpretation Comments



                                       Range                     

 

             Influenza    Negative for                           Specimen



             antigen (test Influenza A/B                           InformationSp

ecimen Source:



             code = 55187-3) antigen.                               Nasopharynge

alSpecimen



                                                                 Site: Left



[id number] 20:20:51





             Test Item    Value        Reference Range Interpretation Comments

 

             Sodium (test code = 143          See_Comment                [Automa

jennifer message]



             2951-2)                                             The system [employer]



                                                                 generated this



                                                                 result transmit

jennifer



                                                                 reference range

: 135



                                                                 - 148 mEq/L. Th

e



                                                                 reference range

 was



                                                                 not used to



                                                                 interpret this



                                                                 result as



                                                                 normal/abnormal

.

 

             Potassium (test code = 4.1          See_Comment                [Aut

omated message]



             2823-3)                                             The system [employer]



                                                                 generated this



                                                                 result transmit

jennifer



                                                                 reference range

: 3.5



                                                                 - 5.0 mEq/L. Th

e



                                                                 reference range

 was



                                                                 not used to



                                                                 interpret this



                                                                 result as



                                                                 normal/abnormal

.

 

             Chloride (test code = 104          See_Comment                [Auto

mated message]



             5-0)                                             The system [employer]



                                                                 generated this



                                                                 result transmit

jennifer



                                                                 reference range

: 99



                                                                 - 109 mEq/L. Th

e



                                                                 reference range

 was



                                                                 not used to



                                                                 interpret this



                                                                 result as



                                                                 normal/abnormal

.

 

             CO2 (test code = 25           See_Comment                [Automated

 message]



             8-9)                                             The system [employer]



                                                                 generated this



                                                                 result transmit

jennifer



                                                                 reference range

: 24



                                                                 - 31 mEq/L. The



                                                                 reference range

 was



                                                                 not used to



                                                                 interpret this



                                                                 result as



                                                                 normal/abnormal

.

 

             Anion gap (test code = 14@ANIO      See_Comment                [Aut

omated message]



             67655-9)                                            The system [employer]



                                                                 generated this



                                                                 result transmit

jennifer



                                                                 reference range

: 7 -



                                                                 15 mEq/L. The



                                                                 reference range

 was



                                                                 not used to



                                                                 interpret this



                                                                 result as



                                                                 normal/abnormal

.

 

             BUN (test code = 4 mg/dL      8-24         L            



             3094-0)                                             

 

             Creatinine (test code = 0.80 mg/dL   0.7-1.2                   



             2160-0)                                             

 

             Glucose (test code = 100 mg/dL    65-99        H            



             2345-7)                                             

 

             Calcium (test code = 9.8 mg/dL    8.6-10.6                  



             28450-7)                                            

 

             Protein (test code = 7.4 g/dL     6.3-8.2                   



             2885-2)                                             

 

             Albumin (test code = 4.6 g/dL     3.5-5                     



             1751-7)                                             

 

             A/G ratio (test code = 1.64         0.70-3.80                 



             1759-0)                                             

 

             Alkaline phosphatase 49 U/L                           



             (test code = 6768-6)                                        

 

             AST (test code = 35 U/L       15-46                     



             1920-8)                                             

 

             ALT (test code = 22 U/L       10-55                     



             1742-6)                                             

 

             Total bilirubin (test 0.7 mg/dL    0.2-1.2                   



             code = 1975-2)                                        

 

             Lab Interpretation Abnormal                               



             (test code = 37703-4)                                        



[facility] [id number] 20:20:50





             Test Item    Value        Reference Range Interpretation Comments

 

             Estimated GFR (test >=90         mL/min/1.73 m2              Anna tirado  Units



             code = 5488)                                        InterpretationG

1



                                                                 >=90     Normal

 or highG2



                                                                       60-89    

Mildly



                                                                 wfrpzjaqdY0z   

     45-59



                                                                  Mildly to mode

rately



                                                                 paihfmkhrW3c   

     30-44



                                                                  Moderately to 

severely



                                                                 decreasedG4    

     15-29



                                                                  Severely decre

asedG5



                                                                   <15      Kidn

ey



                                                                 failureThe eGFR

 was



                                                                 calculated abdifatah babin the



                                                                 Chronic Kidney 

Disease



                                                                 Epidemiology Co

llaboration



                                                                 (CKD-EPI) equat

ion.



                                                                 Interpretation 

is based on



                                                                 recommendations

 of the



                                                                 National Kidney



                                                                 Foundation-Kidn

ey Disease



                                                                 Outcomes Qualit

y Initiative



                                                                 (NKF-KDOQI) pub

lished in



                                                                 .



[place] MethodistAlcohol level, [id number] 20:18:21





             Test Item    Value        Reference Range Interpretation Comments

 

             Alcohol percent (test 0.02 %       0-0.08                    Normal



             code = 5643-2)                                          None Detect

edLegal



                                                                 Intoxication in

 [place]  80



                                                                 mg/dL (0.08%) -

 Whole



                                                                 BloodToxic Conc

entration



                                                                        200 mg/d

L



                                                                 (0.2%)Potential

ly Fatal



                                                                         350 - 5

00 mg/dL



                                                                 (0.35 - 0.5%)



[place] [facility]Creatine kinase, total (CPK)2020 20:18:21





             Test Item    Value        Reference Range Interpretation Comments

 

             Creatine kinase (test code = 2157-6) 200 U/L                 

         



[place] MethodistLactic acid level, SEPSIS - Now and repeat 2x every 3 hours
2020 20:16:54





             Test Item    Value        Reference Range Interpretation Comments

 

             Lactic acid (test code = 35745-1) 1.8 mmol/L   0.5-2.2             

      



[place] [facility] with platelet and [id number] 20:01:40





             Test Item    Value        Reference Range Interpretation Comments

 

             WBC (test code = 10.1         See_Comment                [Automated

 message]



             31048-4)                                            The system [employer]



                                                                 generated this 

result



                                                                 transmitted ref

erence



                                                                 range: 4.5 - 11

.0



                                                                 k/uL. The refer

ence



                                                                 range was not u

sed to



                                                                 interpret this 

result



                                                                 as normal/abnor

mal.

 

             RBC (test code = 5.18         See_Comment                [Automated

 message]



             97969-8)                                            The system [facility]

h



                                                                 generated this 

result



                                                                 transmitted ref

erence



                                                                 range: 4.40 - 6

.00



                                                                 M/uL. The refer

ence



                                                                 range was not u

sed to



                                                                 interpret this 

result



                                                                 as normal/abnor

mal.

 

             HGB (test code = 718-7) 15.9 g/dL    14-18                     

 

             HCT (test code = 47.2 %       41-51                     



             4544-3)                                             

 

             MCV (test code = 787-2) 91.1 fL                          

 

             MCH (test code = 785-6) 30.7 pg      27-34                     

 

             MCHC (test code = 33.7 g/dL    31-37                     



             786-4)                                              

 

             RDW - SD (test code = 43.4 fL      37-55                     



             61020-8)                                            

 

             MPV (test code = 9.5 fL       8.8-13.2                  



             89202-1)                                            

 

             Platelet count (test 236 K/uL     150-400                   



             code = 72372-6)                                        

 

             Nucleated RBC (test 0.00         See_Comment                [Automa

jennifer message]



             code = 95209-4)                                        The system [facility]



                                                                 generated this 

result



                                                                 transmitted ref

erence



                                                                 range: /100 WBC

. The



                                                                 reference range

 was



                                                                 not used to int

erpret



                                                                 this result as



                                                                 normal/abnormal

.

 

             Neutrophils (test code 65.7 %       39-69                     



             = 99381-2)                                          

 

             Lymphocytes (test code 25.4 %       25-45                     



             = 41726-0)                                          

 

             Monocytes (test code = 7.1 %        0-10                      



             26485-3)                                            

 

             Eosinophils (test code 0.6 %        0-5                       



             = 16701-9)                                          

 

             Basophils (test code = 0.9 %        0-1                       



             08102-0)                                            

 

             Immature granulocytes 0.3 %        0-1                       "Immat

ure



             (test code = 93572-6)                                        granul

ocytes"



                                                                 (promyelocytes,



                                                                 myelocytes,



                                                                 metamyelocytes)



Todd [employer]

## 2021-03-04 VITALS — DIASTOLIC BLOOD PRESSURE: 87 MMHG | SYSTOLIC BLOOD PRESSURE: 133 MMHG | TEMPERATURE: 97.8 F

## 2021-03-04 VITALS — OXYGEN SATURATION: 98 %

## 2021-03-04 NOTE — EDPHYS
Physician Documentation                                                                           

 [facility]                                                                 

Name: Amish Espinosa                                                                                 

Age: 55 yrs                                                                                       

Sex: Male                                                                                         

: 1965                                                                                   

MRN: [id number]                                                                                   

Arrival Date: 2021                                                                          

Time: 18:41                                                                                       

Account#: [id number]                                                                            

Bed 24                                                                                            

Private MD:                                                                                       

ED Physician Thanh Mireles                                                                       

HPI:                                                                                              

                                                                                             

18:58 This 55 yrs old  Male presents to ER via EMS with complaints of Suicidal       jmm 

      Ideation.                                                                                   

18:58 The patient presents to the emergency department with suicide ideation, and the patient jmm 

      has a plan, to shoot self, use hose from exhaust of car. Onset: The symptoms/episode        

      began/occurred today. Past psychiatric history: the patient has had a prior suicide         

      gesture, the patient has a previous inpatient psychiatric history. Associated signs and     

      symptoms: Pertinent negatives: abdominal pain. The patient has experienced similar          

      episodes in the past. This is a 55 year old male with a history of chronic back pain        

      whom presents to the ED with complaints of ongoing chronic back pain. Patient states he     

      called a newspaper stating he was planning on killing himself due to [facility]       

      [facility] Patient currently states he plan would be to put a hose       

      in his car exhaust and inhale carbon monoxide.                                              

                                                                                                  

Historical:                                                                                       

- Allergies:                                                                                      

18:50 No Known Allergies;                                                                     hb  

- Home Meds:                                                                                      

21:16 Flexeril 10 mg Oral tab 1 tab 3 times per day [Active]; Tylenol oral 500 mg tab oral 2  sf  

      tabs at bedtime [Active]; Benadryl 25 mg oral cap 1 cap twice a day [Active];               

- PMHx:                                                                                           

18:50 Compression Fractures;                                                                  hb  

- PSHx:                                                                                           

18:50 knee, back sx;                                                                          hb  

                                                                                                  

- Immunization history:: Adult Immunizations.                                                     

- Social history:: Smoking status: Patient reports the use of cigarette tobacco                   

  products, denies chronic smoking, but will smoke occasionally, Patient uses alcohol,            

  occasionally.                                                                                   

                                                                                                  

                                                                                                  

ROS:                                                                                              

18:58 Constitutional: Negative for fever, chills, and weight loss, Cardiovascular: Negative   jmm 

      for chest pain, palpitations, and edema, Respiratory: Negative for shortness of breath,     

      cough, wheezing, and pleuritic chest pain.                                                  

18:58 Back: Positive for pain with movement, chronic pain.                                        

18:58 All other systems are negative.                                                             

                                                                                                  

Exam:                                                                                             

18:58 Constitutional:  This is a well developed, well nourished patient who is awake, alert,  jmm 

      and in no acute distress. Head/Face:  atraumatic. Eyes:  EOMI, no conjunctival erythema     

      appreciated ENT:  Moist Mucus Membranes Neck:  Trachea midline, Supple Chest/axilla:        

      Normal chest wall appearance and motion.   Cardiovascular:  Regular rate and rhythm.        

      No edema appreciated Respiratory:  Normal respirations, no respiratory distress             

      appreciated Abdomen/GI:  Non distended, soft Back:  Normal ROM Skin:  General               

      appearance color normal MS/ Extremity:  Moves all extremities, no obvious deformities       

      appreciated, no edema noted to the lower extremities  Neuro:  Awake and alert, normal       

      gait                                                                                        

18:58 Psych: Behavior/mood is pleasant, cooperative, suicidal.                                    

                                                                                             

04:13 ECG was reviewed by the Attending Physician.                                            marta 

                                                                                                  

Vital Signs:                                                                                      

                                                                                             

18:45  / 78; Pulse 82; Resp 16; Temp 97.9; Pulse Ox 100% on R/A; Pain 10/10;            hb  

19:03 Weight 106.59 kg; Height 6 ft. (182.88 cm);                                             hb  

                                                                                             

01:37 Pain 9/10;                                                                              sf  

06:07  / 74; Pulse 68; Resp 18; Temp 97.2; Pulse Ox 96% ; Pain 3/10;                    sf  

08:29  / 68; Pulse 74; Resp 14; Temp 97.6(O); Pulse Ox 95% on R/A; Pain 4/10;           bw  

14:46  / 85; Pulse 75; Resp 15; Pulse Ox 97% on R/A;                                    mt  

18:58  / 78; Pulse 70; Resp 18; Pulse Ox 98% on R/A; Pain 4/10;                         bw  

19:17  / 87; Pulse 72; Resp 18; Temp 97.8; Pulse Ox 98% on R/A;                         vg1 

                                                                                             

19:03 Body Mass Index 31.87 (106.59 kg, 182.88 cm)                                            hb  

                                                                                                  

MDM:                                                                                              

                                                                                             

19:28 Patient medically screened.                                                             [facility] 

                                                                                             

00:44 Patient medically screened.                                                             marta 

04:14 Differential diagnosis: acute psychotic break, depression, psychosis secondary to       marta 

      non-compliance. Data reviewed: vital signs, nurses notes, lab test result(s), EKG,          

      radiologic studies. Data interpreted: Cardiac monitor: rate is 82 beats/min, rhythm is      

      regular, Pulse oximetry: on room air is 100 %. Test interpretation: by ED physician or      

      midlevel provider: ECG, plain radiologic studies. Counseling: I had a detailed              

      discussion with the patient and/or guardian regarding: the historical points, exam          

      findings, and any diagnostic results supporting the discharge/admit diagnosis, lab          

      results, radiology results, the need to transfer to another facility, for higher level      

      of care, [facility][1] does not immediately have the required specialist.    

16:44 ED course: The patient continues to rest in the ED and has not required and further     kdr 

      intervention, medically or physically.                                                      

18:27 ED course: The patient has been resting comfortably in the ED since arrival. Has only   kdr 

      needed Norco x 1. He has been o/w very cooperative and has only been concerned about        

      ongoing help with his chronic back pain..                                                   

                                                                                                  

                                                                                             

18:58 Order name: Acetaminophen                                                               [facility] 

                                                                                             

18:58 Order name: Basic Metabolic Panel                                                       [facility] 

                                                                                             

18:58 Order name: CBC with Diff                                                               [facility] 

                                                                                             

18:58 Order name: ETOH Level                                                                  [facility] 

                                                                                             

18:58 Order name: Hepatic Function                                                            [facility] 

                                                                                             

18:58 Order name: PT-INR                                                                      [facility] 

                                                                                             

18:58 Order name: Ptt, Activated                                                              [facility] 

                                                                                             

18:58 Order name: Salicylate                                                                  [facility] 

                                                                                             

18:58 Order name: Urine Drug Screen                                                           [facility] 

                                                                                             

18:59 Order name: Acetaminophen Level; Complete Time: 20:06                                   [facility]

                                                                                             

18:59 Order name: Basic Metabolic Panel; Complete Time: 20:06                                 [facility]

                                                                                             

18:59 Order name: CBC with Automated Diff; Complete Time: 19:46                               [facility]

                                                                                             

18:59 Order name: Alcohol Serum/Plasma; Complete Time: 20:06                                  [facility]

                                                                                             

18:59 Order name: Liver (Hepatic) Function; Complete Time: 20:06                              [facility]

                                                                                             

18:58 Order name: EKG; Complete Time: 18:59                                                   [facility] 

                                                                                             

18:59 Order name: Protime (+INR); Complete Time: 19:46                                        [facility]

                                                                                             

19:44 Order name: PTT, Activated Partial Thromb; Complete Time: 19:46                         [facility]

                                                                                             

19:58 Order name: Salicylates Level; Complete Time: 20:06                                     [facility]

                                                                                             

20:35 Order name: COVID-19 : Document "Date of Symptom Onset" if Symptomatic.                 bb  

                                                                                             

21:06 Order name: Urine Drug Screen; Complete Time: 21:16                                     [facility]

                                                                                             

21:34 Order name: Urine Dipstick--Ancillary (enter results)                                   ar5 

                                                                                             

22:07 Order name: Urine Dipstick-Ancillary; Complete Time: 22:28                              [facility]

                                                                                             

23:05 Order name: SARS-COV-2 RT PCR; Complete Time: 23:05                                     [facility]

                                                                                             

12:45 Order name: Diet Finger Food; Complete Time: 12:46                                      em1 

                                                                                             

17:20 Order name: Diet Regular; Complete Time: 17:21                                          bw  

                                                                                             

18:18 Order name: CT Lumbar Spine Wo Con                                                      kdr 

                                                                                             

18:58 Order name: EKG - Nurse/Tech; Complete Time: 19:25                                      jmm 

                                                                                             

18:58 Order name: IV Saline Lock; Complete Time: 19:25                                        jmm 

                                                                                             

18:58 Order name: Labs collected and sent; Complete Time: 19:25                               [facility] 

                                                                                             

18:58 Order name: Urine Dipstick-Ancillary (obtain specimen); Complete Time: 20:49            jmm 

                                                                                                  

EC:13 Rate is 87 beats/min. Rhythm is regular. QRS Axis is Normal. ND interval is normal. QRS marta 

      interval is normal. QT interval is normal. No Q waves. T waves are Normal. No ST            

      changes noted. Clinical impression: NSR w/ Non-specific ST/T Changes and No evidence of     

      ischemia. Interpreted by me. Reviewed by me.                                                

                                                                                                  

Administered Medications:                                                                         

01:37 Drug: Norco 10 mg-325 mg 1 tabs Route: PO;                                              sf  

02:59 Follow up: Response: No adverse reaction; Pain is decreased                             sf  

01:37 Drug: Flexeril 10 mg Route: PO;                                                         sf  

02:59 Follow up: Response: No adverse reaction; Pain is decreased                             sf  

20:05 Drug: Norco 10 mg-325 mg 1 tabs Route: PO;                                              vg1 

20:05 Follow up: Response: Medication administered at discharge.                              vg1 

                                                                                                  

                                                                                                  

Disposition:                                                                                      

04:14 Co-signature as Attending Physician, Sidney Bennett MD I agree with the assessment and  marta 

      plan of care.                                                                               

                                                                                                  

Disposition:                                                                                      

21 18:46 Discharged to Home. Impression: Chronic Low back Pain,, Major depressive           

  disorder, recurrent.                                                                            

- Condition is Stable.                                                                            

- Discharge Instructions: Chronic Back Pain, Chronic Pain, Suicidal Feelings: How to              

  Help Yourself, Back Pain, Adult, Easy-to-Read.                                                  

- Prescriptions for Tylenol- Codeine #3 300-30 mg Oral Tablet - take 1 tablet by ORAL             

  route every 4-6 hours As needed; 8 tablet. Cyclobenzaprine 10 mg Oral Tablet - take 1           

  tablet by ORAL route every 8 hours As needed; 6 tablet.                                         

- Medication Reconciliation Form, Thank You Letter, Prescription Opioid Use form.                 

- Follow up: Jose J Chacon DO; When: Monday; Reason: Further diagnostic work-up,              

  Recheck today's complaints, Continuance of care, Re-evaluation by your physician.               

- Notes: Please follow-up at Dr. Burns's office at 9:30 on Monday morning. Make sure           

  to take copies of your radiology reports.                                                       

                                                                                                  

                                                                                                  

Signatures:                                                                                       

Dispatcher [employer]                           [facility]                                                 

Sidney Bennett MD MD cha Rittger, Kevin, MD MD kdr Mickail, Joel, PA PA   Kay Ordoñez RN RN hb Garcia, Victoria, RN                    RN   vg1                                                  

Gabriel Rankin RN                 RN   sf                                                   

                                                                                                  

Corrections: (The following items were deleted from the chart)                                    

                                                                                             

21:18 19:03 Home Meds: Flexeril Oral; hb                                                      sf  

21:18 19:03 Home Meds: Tylenol oral oral; hb                                                  sf  

21:18 19:03 Home Meds: Benadryl Oral; hb                                                      sf  

23:26 22:18 CORONAVIRUS ordered. [facility]

                                                                                             

18:43 00:44 2021 00:44 Transfer ordered to [facility]. Diagnosis   kdr 

      is Suicidal ideations; Major depressive disorder, recurrent. Reason for transfer:           

      Higher level of care. [facility] Condition is Fair. Problem       

      is new. Symptoms have improved. marta                                                         

20:06 18:46 2021 18:46 Discharged to Home. Impression: Chronic Low back Pain,; Major    vg1 

      depressive disorder, recurrent. Condition is Stable. Forms are Medication                   

      Reconciliation Form, Thank You Letter, Antibiotic Education, Prescription Opioid Use.       

      Follow up: Jose J Chacon; When: Monday; Reason: Further diagnostic work-up, Recheck       

      today's complaints, Continuance of care, Re-evaluation by your physician. kdr               

                                                                                                  

**************************************************************************************************

## 2021-03-04 NOTE — ER
Nurse's Notes                                                                                     

 [facility]                                                                 

Name: Amish Espinosa                                                                                 

Age: 55 yrs                                                                                       

Sex: Male                                                                                         

: 1965                                                                                   

MRN: [id number]                                                                                   

Arrival Date: 2021                                                                          

Time: 18:41                                                                                       

Account#: [id number]                                                                            

Bed 24                                                                                            

Private MD:                                                                                       

Diagnosis: Chronic Low back Pain,;Major depressive disorder, recurrent                            

                                                                                                  

Presentation:                                                                                     

                                                                                             

18:45 Chief complaint: EMS states: [facility], has been unable   hb  

      attain pain relief. reports he has been getting the run around and not receiving help,      

      contacted local media and senators to complain and ask for help, told officers on scene     

      that he has numerous guns and will use on himself if he can not get help for his pain       

      issue. Per [facility], guns were secured by police on scene. CASTILLO placed in        

      paper chart. Coronavirus screen: At this time, the client does not indicate any             

      symptoms associated with coronavirus-19. Ebola Screen: No symptoms or risks identified      

      at this time. Initial Sepsis Screen: Does the patient meet any 2 criteria? No.              

      Patient's initial sepsis screen is negative. Does the patient have a suspected source       

      of infection? No. Patient's initial sepsis screen is negative. Risk Assessment: Do you      

      want to hurt yourself or someone else? Patient reports no desire to harm self or            

      others. Onset of symptoms was 2021.                                               

18:45 Method Of Arrival: EMS: [facility]                                                       hb  

18:45 Acuity: ANTONINO 2                                                                           hb  

                                                                                                  

Triage Assessment:                                                                                

18:50 General: Appears in no apparent distress. Behavior is calm, cooperative. Pain: Pain     hb  

      currently is 10 out of 10 on a pain scale. EENT: No signs and/or symptoms were reported     

      regarding the EENT system. Neuro: Level of Consciousness is awake, alert, obeys             

      commands, Oriented to person, place, time, situation. Cardiovascular: Capillary refill      

      < 3 seconds Patient's skin is warm and dry. Respiratory: Respiratory effort is even,        

      unlabored, Respiratory pattern is regular, symmetrical. GI: No signs and/or symptoms        

      were reported involving the gastrointestinal system. : No signs and/or symptoms were      

      reported regarding the genitourinary system. Derm: Skin is pink, warm \T\ dry.              

      Musculoskeletal: Reports back pain 10/10.                                                   

                                                                                                  

Historical:                                                                                       

- Allergies:                                                                                      

18:50 No Known Allergies;                                                                     hb  

- Home Meds:                                                                                      

21:16 Flexeril 10 mg Oral tab 1 tab 3 times per day [Active]; Tylenol oral 500 mg tab oral 2  sf  

      tabs at bedtime [Active]; Benadryl 25 mg oral cap 1 cap twice a day [Active];               

- PMHx:                                                                                           

18:50 Compression Fractures;                                                                  hb  

- PSHx:                                                                                           

18:50 knee, back sx;                                                                          hb  

                                                                                                  

- Immunization history:: Adult Immunizations.                                                     

- Social history:: Smoking status: Patient reports the use of cigarette tobacco                   

  products, denies chronic smoking, but will smoke occasionally, Patient uses alcohol,            

  occasionally.                                                                                   

                                                                                                  

                                                                                                  

Screenin:51 Abuse screen: Denies threats or abuse. Denies injuries from another. Nutritional        hb  

      screening: No deficits noted. Tuberculosis screening: No symptoms or risk factors           

      identified. Fall Risk Total Rico Fall Scale indicates Low Risk Score (25-44 pts). Fall     

      prevention measures have been instituted. Side Rails Up X 2 Placed close to Nursing         

      Station Frequent Obs/Assesments occuring As available Patient and Family Educated on        

      Fall Prevention Program and strategies.                                                     

                                                                                                  

Assessment:                                                                                       

18:51 General: see triage assessment .                                                        hb  

19:15 General: Appears in no apparent distress. comfortable, Behavior is calm, cooperative,   sf  

      appropriate for age. Pain: Complains of pain in Generalized body pains, legs, back,         

      neck, shoulders Pain began years ago. Neuro: No deficits noted. Level of Consciousness      

      is awake, alert, Oriented to person, place, time, situation. Cardiovascular: No             

      deficits noted. Patient's skin is warm and dry. Respiratory: Reports cough that is          

      non-productive, Airway is patent Respiratory effort is even, unlabored, Respiratory         

      pattern is regular, symmetrical, Denies shortness of breath. Musculoskeletal: Reports       

      pain in back of neck, back, right leg, left leg and neck.                                   

20:08 Reassessment: pt's cash was counted by Anabelle rodriguez RN, and Gabriel HUFF RN. Riggins   bb  

      was placed in security cash envelope, sealed, and signed by patient then given to           

      security for safe keeping.                                                                  

21:15 Reassessment: Patient appears in no apparent distress at this time. No changes from     sf  

      previously documented assessment. Patient and/or family updated on plan of care and         

      expected duration. Pain level reassessed. Patient is alert, oriented x 3, equal             

      unlabored respirations, skin warm/dry/pink. Patient resting in stretcher.                   

22:21 Reassessment: Patient appears in no apparent distress at this time. No changes from     sf  

      previously documented assessment. Patient and/or family updated on plan of care and         

      expected duration. Pain level reassessed. Patient is alert, oriented x 3, equal             

      unlabored respirations, skin warm/dry/pink.                                                 

23:30 Reassessment: Patient appears in no apparent distress at this time. No changes from     sf  

      previously documented assessment. Patient and/or family updated on plan of care and         

      expected duration. Pain level reassessed. Patient is alert, oriented x 3, equal             

      unlabored respirations, skin warm/dry/pink.                                                 

                                                                                             

00:27 Reassessment: Patient appears in no apparent distress at this time. No changes from     sf  

      previously documented assessment. Patient and/or family updated on plan of care and         

      expected duration. Pain level reassessed. Patient is alert, oriented x 3, equal             

      unlabored respirations, skin warm/dry/pink.                                                 

01:38 Reassessment: No changes from previously documented assessment. Patient and/or family   sf  

      updated on plan of care and expected duration. Pain level reassessed. Patient is alert,     

      oriented x 3, equal unlabored respirations, skin warm/dry/pink.                             

02:58 Reassessment: Patient appears in no apparent distress at this time. Patient and/or      sf  

      family updated on plan of care and expected duration. Pain level reassessed. Patient is     

      alert, oriented x 3, equal unlabored respirations, skin warm/dry/pink. Pain down to         

      3/10.                                                                                       

04:08 Reassessment: Patient appears in no apparent distress at this time. No changes from     sf  

      previously documented assessment. Patient and/or family updated on plan of care and         

      expected duration. Pain level reassessed. Patient is alert, oriented x 3, equal             

      unlabored respirations, skin warm/dry/pink.                                                 

05:08 Reassessment: Patient appears in no apparent distress at this time. No changes from     sf  

      previously documented assessment. Patient and/or family updated on plan of care and         

      expected duration. Pain level reassessed. Patient is alert, oriented x 3, equal             

      unlabored respirations, skin warm/dry/pink.                                                 

06:08 Reassessment: Patient appears in no apparent distress at this time. No changes from     sf  

      previously documented assessment. Patient and/or family updated on plan of care and         

      expected duration. Pain level reassessed. Patient is alert, oriented x 3, equal             

      unlabored respirations, skin warm/dry/pink. Patient states feeling better. Patient          

      states symptoms have improved.                                                              

06:40 Reassessment: Night shift sitter hand off to day shift sitter at this time.             sf  

07:05 Reassessment: No changes from previously documented assessment. Patient and/or family   bw  

      updated on plan of care and expected duration. Pain level reassessed. pt sleeping at        

      this time.                                                                                  

08:05 Reassessment: No changes from previously documented assessment. Patient and/or family   bw  

      updated on plan of care and expected duration. Pain level reassessed. rates pain 4/10       

      in hands.                                                                                   

09:05 Reassessment: Patient and/or family updated on plan of care and expected duration. Pain bw  

      level reassessed. Patient is alert, oriented x 3, equal unlabored respirations, skin        

      warm/dry/pink.                                                                              

10:05 Reassessment: No changes from previously documented assessment. Patient and/or family   bw  

      updated on plan of care and expected duration. Pain level reassessed. pt sleeping at        

      this time. .                                                                                

10:08 Reassessment: APS called to follow up on patient. [facility] . Phone  bw  

      number to be contacted, [phone].                                                       

11:05 Reassessment: No changes from previously documented assessment. Patient and/or family   bw  

      updated on plan of care and expected duration. Pain level reassessed. pt continues to       

      sleep. .                                                                                    

12:05 Reassessment: Patient appears in no apparent distress at this time. No changes from     bw  

      previously documented assessment.                                                           

13:05 Reassessment: No changes from previously documented assessment. Patient and/or family   bw  

      updated on plan of care and expected duration. Pain level reassessed.                       

14:05 Reassessment: Patient appears in no apparent distress at this time. No changes from     bw  

      previously documented assessment. Patient and/or family updated on plan of care and         

      expected duration. Pain level reassessed. pt awake, voided 300 ml. requesting food and      

      drink, provided. will continue to monitor. .                                                

15:05 Reassessment: Patient appears in no apparent distress at this time. No changes from     bw  

      previously documented assessment. Patient and/or family updated on plan of care and         

      expected duration. Pain level reassessed. Patient is alert, oriented x 3, equal             

      unlabored respirations, skin warm/dry/pink.                                                 

16:05 Reassessment: Patient appears in no apparent distress at this time. No changes from     bw  

      previously documented assessment. Patient and/or family updated on plan of care and         

      expected duration. Pain level reassessed. Patient is alert, oriented x 3, equal             

      unlabored respirations, skin warm/dry/pink.                                                 

17:05 Reassessment: Patient appears in no apparent distress at this time. No changes from     bw  

      previously documented assessment. Patient and/or family updated on plan of care and         

      expected duration. Pain level reassessed.                                                   

18:05 Reassessment: Patient appears in no apparent distress at this time. No changes from     bw  

      previously documented assessment. Patient and/or family updated on plan of care and         

      expected duration. Pain level reassessed. Patient is alert, oriented x 3, equal             

      unlabored respirations, skin warm/dry/pink.                                                 

20:06 Reassessment: Received VO from Dr Bennett to administer Norco 10 mg/ 325 mg PO x1.     vg1 

                                                                                                  

Psych:                                                                                            

                                                                                             

19:15 [place] Suicide Severity Screening: In the past month, have you wished you were dead   sf  

      or wished you could go to sleep and not wake up? Patient responds "yes." "In the past       

      month, have you actually had any thoughts of killing yourself?" Patient responds "yes."     

      "In your lifetime, have you ever done anything, started to do anything, or prepared to      

      do anything to end your life?" Patient responds "yes." Patient reports suicidal intent      

      within 3 past months. Subjective: Having thoughts of suicide. Plan for suicide is Shoot     

      himself. Objective: Patient is cooperative, Speech is normal, Affect is appropriate.        

      Interventions: Removed personal items and placed in bag. Patient placed in hospital         

      gown. Searched person for dangerous items. Urine collected and sent for urine drug          

      test. Belonging list filled out. Suicide Risk Assessment: Sad Person Scale: Sex of          

      patient: Male: Score 1 point. Age of patient: Score 0 point if patient falls outside of     

      specified age parameters. Depression: Score 1 point if signs of depression are present.     

      Previous Attempt: Score 0 point if patient has not previously attempted suicide.            

      Substance Abuse: Score 0 point if patient does not abuse alcohol or drugs. Rational         

      Thinking: Score 0 point if patient has rational thinking. Social Support: Score 1 point     

      if social support is lacking and/or unavailable. Organized Plan: Score 1 point if           

      patient had a plan in place. Relationship: Score 1 point if patient is ,            

      , , or for a single male Chronic Sickness: Score 1 point if patient has     

      illness, chronic, debilitating, or severe. TOTAL POINTS: If total points are 5-6,           

      proposed clinical action is to strongly consider hospitalization, depending upon            

      confidence in the follow-up arrangement. Implement suicide precautions. Safety Checks:      

      Personal items have been removed. Door is open. No visitors are present at this time.       

      sitter at bedside. Pt denies substance abuse.                                               

                                                                                                  

Vital Signs:                                                                                      

18:45  / 78; Pulse 82; Resp 16; Temp 97.9; Pulse Ox 100% on R/A; Pain 10/10;            hb  

19:03 Weight 106.59 kg; Height 6 ft. (182.88 cm);                                             hb  

0304                                                                                             

01:37 Pain 9/10;                                                                              sf  

06:07  / 74; Pulse 68; Resp 18; Temp 97.2; Pulse Ox 96% ; Pain 3/10;                    sf  

08:29  / 68; Pulse 74; Resp 14; Temp 97.6(O); Pulse Ox 95% on R/A; Pain 4/10;           bw  

14:46  / 85; Pulse 75; Resp 15; Pulse Ox 97% on R/A;                                    mt  

18:58  / 78; Pulse 70; Resp 18; Pulse Ox 98% on R/A; Pain 4/10;                         bw  

19:17  / 87; Pulse 72; Resp 18; Temp 97.8; Pulse Ox 98% on R/A;                         vg1 

                                                                                             

19:03 Body Mass Index 31.87 (106.59 kg, 182.88 cm)                                              

                                                                                                  

ED Course:                                                                                        

                                                                                             

18:41 Patient arrived in ED.                                                                  hb  

18:43 Negrito Gutierrez PA is PHCP.                                                              [facility] 

18:43 Thanh Mireles MD is Attending Physician.                                              jm 

18:49 Triage completed.                                                                       hb  

18:50 Arm band placed on.                                                                     hb  

18:51 Patient has correct armband on for positive identification. Bed in low position. Call     

      light in reach. Side rails up X2.                                                           

19:05 Kay Nieto, RN is Primary Nurse.                                                   hb  

19:15 Sitter at bedside. Cardiac monitoring not applicable on this patient. Noise minimized.  sf  

      Visitors limited. Lights dimmed. Warm blanket given. Verbal reassurance given.              

19:19 EKG done, by ED staff, reviewed by Negrito DOLL.                                     sf  

19:20 Initial lab(s) drawn, by me, sent to lab. Inserted saline lock: 20 gauge in left        sf  

      antecubital area, using aseptic technique. Blood collected.                                 

20:26 Called [facility] to speak with pt.                                                     ar5 

21:00 Negrito DOLL stated to just fax clincals since pt. is on an CASTILLO.                     ar5 

21:07 Urine Drug Screen Sent.                                                                 sf  

21:07 Salicylate Sent.                                                                        sf  

21:07 Ptt, Activated Sent.                                                                    sf  

21:07 PT-INR Sent.                                                                            sf  

21:07 Hepatic Function Sent.                                                                  sf  

21:07 ETOH Level Sent.                                                                        sf  

21:07 CBC with Diff Sent.                                                                     sf  

21:07 Basic Metabolic Panel Sent.                                                             sf  

21:07 Acetaminophen Sent.                                                                     sf  

22:20 Awaiting disposition.                                                                   sf  

23:30 Faxed pt. clinical's to all facilities after receiving UDS and COVID results.           ar5 

                                                                                             

00:42 Attending Physician role handed off by Thanh Mireles MD                               marta 

00:42 Sidney Bennett MD is Attending Physician.                                             marta 

01:11 [facility] Per transfer center they need the clinical's faxed to them    ar5 

      and if they accept the pt. to the facility then we will not receive a call until after      

      0800.                                                                                       

01:31 COVID-19 : Document "Date of Symptom Onset" if Symptomatic. Sent.                       sf  

01:32 Urine Dipstick--Ancillary (enter results) Sent.                                         sf  

03:00 No provider procedures requiring assistance completed.                                  sf  

07:08 Report given to DRAKE Garcia.                                                            sf  

08:40 [facility] to get an update on [facility] status for this pt, spoke with   [facility] Montero; was told to fax all clinical information again.                                       

09:00 Attending Physician role handed off by Sidney Bennett MD                              kdr 

09:00 Thanh Mireles MD is Attending Physician.                                              kdr 

10:25 Clinical informaion faxed to [facility].                     em1 

18:33 CT Lumbar Spine Wo Con In Process Unspecified.                                          EDMS

18:44 Jose J Chacon DO is Referral Physician.                                             kdr 

19:54 IV discontinued, intact, bleeding controlled, No redness/swelling at site. Pressure     vg1 

      dressing applied.                                                                           

                                                                                                  

Administered Medications:                                                                         

01:37 Drug: Norco 10 mg-325 mg 1 tabs Route: PO;                                              sf  

02:59 Follow up: Response: No adverse reaction; Pain is decreased                             sf  

01:37 Drug: Flexeril 10 mg Route: PO;                                                         sf  

02:59 Follow up: Response: No adverse reaction; Pain is decreased                             sf  

20:05 Drug: Norco 10 mg-325 mg 1 tabs Route: PO;                                              vg1 

20:05 Follow up: Response: Medication administered at discharge.                              vg1 

                                                                                                  

                                                                                                  

Outcome:                                                                                          

00:44 ER care complete, transfer ordered by MD.                                               marta 

18:46 Discharge ordered by MD.                                                                kdr 

19:54 Discharged to home ambulatory.                                                          vg1 

19:54 Condition: stable                                                                           

19:54 Discharge instructions given to patient, Instructed on discharge instructions, follow       

      up and referral plans. medication usage, Demonstrated understanding of instructions,        

      follow-up care, medications, Prescriptions given X 2.                                       

20:06 Patient left the ED.                                                                    vg1 

                                                                                                  

Signatures:                                                                                       

Dispatcher [employer]                           EDMS                                                 

Sidney Bennett MD MD cha Rittger, Kevin, MD MD kdr Mickail, Joel, PA PA jmm Ballard, Brenda, RN RN bb Martinez, Eric                               em1                                                  

Kay Nieto RN RN hb Thompson, Moriah mt Robles, Rachel                               ar5                                                  

Flaquita Bajwa RN                    RN   vg1                                                  

Gabriel Rankin RN RN sf Webb, Bethany, RN RN                                                      

                                                                                                  

Corrections: (The following items were deleted from the chart)                                    

0303                                                                                             

19:04 18:45 Chief complaint: EMS states: [facility], has been    hb  

      unable attain pain medication/interventions for several weeks, reports he has been          

      getting the run around and not receiving help, contacted local media and senators to        

      complain and ask for help, told officers on scene that he has numerous guns and will        

      use on himself if he can not get help for his pain issue. Per [facility],         

      guns were secured by police on scene. CASTILLO placed in paper chart. hb                         

21:18 19:03 Home Meds: Flexeril Oral; hb                                                      sf  

21:18 19:03 Home Meds: Tylenol oral oral; hb                                                  sf  

21:18 19:03 Home Meds: Benadryl Oral; hb                                                      sf  

23:48 19:15 Respiratory: No deficits noted. Airway is patent Respiratory effort is even,      sf  

      unlabored, Respiratory pattern is regular, symmetrical, sf                                  

03/04                                                                                             

10:00 09:55 Reassessment:                                                                   bw  

                                                                                                  

**************************************************************************************************

## 2021-03-04 NOTE — RAD REPORT
EXAM DESCRIPTION:  CT - Spine Lumbar Wo Con - 3/4/2021 6:35 pm

 

CLINICAL HISTORY:   Radiculopathy.

PAIN

 

COMPARISON:  No comparisons

 

TECHNIQUE:  Axial noncontrast CT imaging of the lumbar spine was performed with coronal and sagittal 
re-formatted images.

 

All CT scans are performed using dose optimization technique as appropriate and may include automated
 exposure control or mA/KV adjustment according to patient size.

 

FINDINGS:  No acute lumbar spine fracture seen. No aggressive marrow pattern or malalignment.

 

Paraspinal tissues are normal in thickness. No paraspinal abscess or hematoma seen.

 

Postsurgical fusion with hardware in place noted L5-S1. Mild lower lumbar spondylosis is present.

 

IMPRESSION:  No acute lumbar spine abnormality is detected.

 

Postsurgical fusion with hardware in place spanning L5-S1.